# Patient Record
Sex: MALE | Race: WHITE | Employment: FULL TIME | URBAN - METROPOLITAN AREA
[De-identification: names, ages, dates, MRNs, and addresses within clinical notes are randomized per-mention and may not be internally consistent; named-entity substitution may affect disease eponyms.]

---

## 2017-02-24 ENCOUNTER — OFFICE VISIT (OUTPATIENT)
Dept: CARDIOLOGY | Facility: CLINIC | Age: 57
End: 2017-02-24
Payer: COMMERCIAL

## 2017-02-24 VITALS
DIASTOLIC BLOOD PRESSURE: 85 MMHG | SYSTOLIC BLOOD PRESSURE: 137 MMHG | HEART RATE: 67 BPM | WEIGHT: 153 LBS | BODY MASS INDEX: 26.12 KG/M2 | HEIGHT: 64 IN

## 2017-02-24 DIAGNOSIS — R00.2 PALPITATIONS: Primary | ICD-10-CM

## 2017-02-24 PROCEDURE — 99203 OFFICE O/P NEW LOW 30 MIN: CPT | Performed by: INTERNAL MEDICINE

## 2017-02-24 PROCEDURE — 93000 ELECTROCARDIOGRAM COMPLETE: CPT | Performed by: INTERNAL MEDICINE

## 2017-02-24 NOTE — PROGRESS NOTES
HPI and Plan:   See dictation        Orders Placed This Encounter   Procedures     EKG 12-lead complete w/read - Clinics (performed today)     No orders of the defined types were placed in this encounter.    Medications Discontinued During This Encounter   Medication Reason     Glucosamine 500 MG CAPS Stopped by Patient     Multiple Vitamins-Minerals (MULTIVITAL PO) Stopped by Patient     SAW PALMETTO Stopped by Patient         Encounter Diagnosis   Name Primary?     Palpitations Yes       CURRENT MEDICATIONS:  Current Outpatient Prescriptions   Medication Sig Dispense Refill     finasteride (PROSCAR) 5 MG tablet Take 1 tablet by mouth daily. 90 tablet 3       ALLERGIES     Allergies   Allergen Reactions     Erythromycin      Upset stomach     Penicillin G      Breathing        PAST MEDICAL HISTORY:  Past Medical History   Diagnosis Date     Alopecia      History of atrial fibrillation 1989     3 times so far     Hyperlipidemia      Palpitations        PAST SURGICAL HISTORY:  Past Surgical History   Procedure Laterality Date     Hc tooth extraction w/forcep       age 28     Herniorrhaphy inguinal       7/3/2010     Vasectomy       15 years ago       FAMILY HISTORY:  Family History   Problem Relation Age of Onset     CANCER Father      lung-smoker     HEART DISEASE Father      angioplasty-at age of 60's     Prostate Cancer Father      diagnosed early 70's     Family History Negative Mother      Family History Negative Sister      3     DIABETES No family hx of      Cancer - colorectal No family hx of        SOCIAL HISTORY:  Social History     Social History     Marital status: Unknown     Spouse name: N/A     Number of children: N/A     Years of education: N/A     Social History Main Topics     Smoking status: Never Smoker     Smokeless tobacco: Never Used     Alcohol use Yes      Comment: social     Drug use: No     Sexual activity: Yes     Partners: Female     Birth control/ protection: Surgical     Other Topics  "Concern     Parent/Sibling W/ Cabg, Mi Or Angioplasty Before 65f 55m? No     Social History Narrative         Review of Systems:  Skin:  Negative       Eyes:  Negative      ENT:  Negative      Respiratory:  Negative       Cardiovascular:    Positive for;chest pain;lightheadedness;palpitations (tingingling, all sx occured together)    Gastroenterology: Negative      Genitourinary:  Positive for prostate problem    Musculoskeletal:  Positive for (compression FX) back pain    Neurologic:  Negative      Psychiatric:  Negative      Heme/Lymph/Imm:  Positive for allergies    Endocrine:  Negative        Physical Exam:  Vitals: /85 (BP Location: Right arm, Patient Position: Chair, Cuff Size: Adult Regular)  Pulse 67  Ht 1.626 m (5' 4\")  Wt 69.4 kg (153 lb)  BMI 26.26 kg/m2    Constitutional:           Skin:           Head:           Eyes:           ENT:           Neck:           Chest:             Cardiac:                    Abdomen:           Vascular:                                          Extremities and Back:                 Neurological:             Recent Lab Results:  LIPID RESULTS:  Lab Results   Component Value Date    CHOL 208 (H) 12/07/2010    HDL 74 12/07/2010     12/07/2010    TRIG 47 12/07/2010    CHOLHDLRATIO 2.8 12/07/2010       LIVER ENZYME RESULTS:  No results found for: AST, ALT    CBC RESULTS:  Lab Results   Component Value Date    WBC 6.2 12/07/2010    RBC 4.67 12/07/2010    HGB 15.0 12/07/2010    HCT 43.8 12/07/2010    MCV 94 12/07/2010    MCH 32.1 12/07/2010    MCHC 34.2 12/07/2010    RDW 12.1 12/07/2010     12/07/2010       BMP RESULTS:  Lab Results   Component Value Date    GLC 97 12/07/2010        A1C RESULTS:  No results found for: A1C    INR RESULTS:  No results found for: INR        CC  No referring provider defined for this encounter.                    "

## 2017-02-24 NOTE — PROGRESS NOTES
HISTORY OF PRESENT ILLNESS:  I got together with Ray Nicola again today.  I originally saw him back in 2007 and then subsequently in 2010.  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  He had a history of some paroxysmal atrial fibrillation.  There were questions about his lipid profile and all in all it turned out that he has been a relatively healthy man.  He has not had any recurrence of his paroxysmal atrial fibrillation.  He watches his diet and exercises regularly with no sense of limitation.      Recently, he had a rather intense episode of the blue of a focal left parasternal discomfort.  It was quite focused, it was sharp, it was followed over several minutes by a sense of oral tingling and some anxiousness and discomfort.  He had some vague feelings in the neck towards his jaw, although not once again classically anginal or pressure, burning, tightness, squeezing, etc.      His symptoms resolved rather quickly.  He subsequently did his own stress test and went out for a run.  He is physically very fit and typically runs 8-1/2 to 9-minute miles and he exercised vigorously with no sense of recurrent symptoms or tightness, burning pressure, squeezing, heaviness in the chest.  His symptoms were decidedly atypical noncardiac sounding chest discomfort.      It has not been recurrent.  It only lasted for a matter of minutes and he has not had any further systemic symptoms.      Ray is 56.  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  He does not smoke.  He rarely drinks.  He exercises regularly.      Last summer he ran in a marathon and did so without any sense of limitation and it was satisfying for him.  He plans to run in another marathon later this year.      REVIEW OF SYSTEMS:  Negative only for this episode of chest discomfort, lightheadedness and overall tingling.    UPPER GI AND LOWER GASTROINTESTINAL:  Negative.   GENITOURINARY:  Positive for a little bit of  frequency.     MUSCULOSKELETAL:  He has had some intermittent back pain, but he treats this cautiously as he knows and understands back pain quite well.   PSYCHIATRIC, HEMATOLOGIC AND ENDOCRINE:  Negative.      CURRENT MEDICATIONS:  None.      Resting EKG done this morning was totally normal QRS and ST-T morphology.      PHYSICAL EXAMINATION:   GENERAL:  Reveals a trim, remarkably fit, adult male.   VITAL SIGNS:  Blood pressure 136/85.  Heart rate 66 beats per minute.  He weighs 153 pounds which is stable for him.   HEAD:  Normal.   NECK:  Free of neck vein distention or bruit.  Carotids are equal.   HEART:  Regular without gallop, murmur, rub or click.  PMI is normal in the mid clavicular line.   LUNGS:  Clear to auscultation.   ABDOMEN:  Flat without organomegaly, mass or pain.   EXTREMITIES:  Show +2 posterior tibial pulses, +1 dorsalis pedis pulses and no edema.      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  He takes meticulous care of himself.  He had transient focal episode of left parasternal discomfort that sounds musculoskeletal rather than cardiac or visceral.  We discussed doing things like checking some lab work and he says he will be visiting with you for primary care assessment subsequently.      We talked about getting a chest x-ray, although the fleeting nature of his discomfort did not push us in that direction.      We talked about getting a calcium score as a separate assessment of possible coronary artery disease and he will discuss that with you when he sees you.      IMPRESSION:   1.  Atypical noncardiac musculoskeletal left parasternal chest pain.   2.  No current evidence or support for angina.   3.  No recurrence of the aforementioned symptoms and remarkably healthy and fit otherwise.   4.  History of paroxysmal atrial fibrillation, but no current recurrences.   5.  He does exercise vigorously and plans to run another marathon this year.  We discussed doing a surveillance stress study.  He felt and I agreed that in  the absence of symptoms during significant vigorous exercise for the time being stress testing was not needed.      Prognosis is excellent.  If he has further problems or symptoms and I can be of help, let me know.      cc:   Ijeoma Lyons MD   [address], [address] [place] [address]  [id number]         MEGHA GARRISON MD, [facility]             D: 2017 12:02   T: 2017 12:38   MT: VD      Name:     DANIEL BERRY   MRN:      [id number]        Account:      [id number]   :      1960           Service Date: 2017      Document: [id number]

## 2017-02-24 NOTE — LETTER
2/24/2017    Ijeoma Lyons MD   [facility]   [address], [address]   [place]  [address]     RE: Ray Petersen       Dear Colleague,    I had the pleasure of seeing Ray BEN Petersen in the [facility].    I got together with Ray Petersen again today.  I originally saw him back in 2007 and then subsequently in 2010.  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  He had a history of some paroxysmal atrial fibrillation.  There were questions about his lipid profile and all in all it turned out that he has been a relatively healthy man.  He has not had any recurrence of his paroxysmal atrial fibrillation.  He watches his diet and exercises regularly with no sense of limitation.      Recently, he had a rather intense episode of the blue of a focal left parasternal discomfort.  It was quite focused, it was sharp, it was followed over several minutes by a sense of oral tingling and some anxiousness and discomfort.  He had some vague feelings in the neck towards his jaw, although not once again classically anginal or pressure, burning, tightness, squeezing, etc.      His symptoms resolved rather quickly.  He subsequently did his own stress test and went out for a run.  He is physically very fit and typically runs 8-1/2 to 9-minute miles and he exercised vigorously with no sense of recurrent symptoms or tightness, burning pressure, squeezing, heaviness in the chest.  His symptoms were decidedly atypical noncardiac sounding chest discomfort.      It has not been recurrent.  It only lasted for a matter of minutes and he has not had any further systemic symptoms.      Ray is 56.  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  He does not smoke.  He rarely drinks.  He exercises regularly.      Last summer he ran in a marathon and did so without any sense of limitation and it was satisfying for him.  He plans to run in another  marathon later this year.      REVIEW OF SYSTEMS:  Negative only for this episode of chest discomfort, lightheadedness and overall tingling.    UPPER GI AND LOWER GASTROINTESTINAL:  Negative.   GENITOURINARY:  Positive for a little bit of frequency.     MUSCULOSKELETAL:  He has had some intermittent back pain, but he treats this cautiously as he knows and understands back pain quite well.   PSYCHIATRIC, HEMATOLOGIC AND ENDOCRINE:  Negative.      CURRENT MEDICATIONS:  None.      Resting EKG done this morning was totally normal QRS and ST-T morphology.      PHYSICAL EXAMINATION:   GENERAL:  Reveals a trim, remarkably fit, adult male.   VITAL SIGNS:  Blood pressure 136/85.  Heart rate 66 beats per minute.  He weighs 153 pounds which is stable for him.   HEAD:  Normal.   NECK:  Free of neck vein distention or bruit.  Carotids are equal.   HEART:  Regular without gallop, murmur, rub or click.  PMI is normal in the mid clavicular line.   LUNGS:  Clear to auscultation.   ABDOMEN:  Flat without organomegaly, mass or pain.   EXTREMITIES:  Show +2 posterior tibial pulses, +1 dorsalis pedis pulses and no edema.      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  He takes meticulous care of himself.  He had transient focal episode of left parasternal discomfort that sounds musculoskeletal rather than cardiac or visceral.  We discussed doing things like checking some lab work and he says he will be visiting with you for primary care assessment subsequently.      We talked about getting a chest x-ray, although the fleeting nature of his discomfort did not push us in that direction.      We talked about getting a calcium score as a separate assessment of possible coronary artery disease and he will discuss that with you when he sees you.      IMPRESSION:   1.  Atypical noncardiac musculoskeletal left parasternal chest pain.   2.  No current evidence or support for angina.   3.  No recurrence of the aforementioned symptoms and remarkably healthy and fit  otherwise.   4.  History of paroxysmal atrial fibrillation, but no current recurrences.   5.  He does exercise vigorously and plans to run another marathon this year.  We discussed doing a surveillance stress study.  He felt and I agreed that in the absence of symptoms during significant vigorous exercise for the time being stress testing was not needed.      Prognosis is excellent.  If he has further problems or symptoms and I can be of help, let me know.     Again, thank you for allowing me to participate in the care of your patient.      Sincerely,    MEGHA GARRISON MD     [facility]

## 2017-04-11 ENCOUNTER — HOSPITAL ENCOUNTER (EMERGENCY)
Facility: CLINIC | Age: 57
Discharge: HOME | End: 2017-04-11
Attending: CLINICAL NURSE SPECIALIST | Admitting: CLINICAL NURSE SPECIALIST
Payer: COMMERCIAL

## 2017-04-11 VITALS
HEART RATE: 80 BPM | TEMPERATURE: 97.5 F | WEIGHT: 150 LBS | RESPIRATION RATE: 18 BRPM | BODY MASS INDEX: 24.11 KG/M2 | DIASTOLIC BLOOD PRESSURE: 96 MMHG | SYSTOLIC BLOOD PRESSURE: 151 MMHG | HEIGHT: 66 IN | OXYGEN SATURATION: 98 %

## 2017-04-11 DIAGNOSIS — S01.81XA FACIAL LACERATION, INITIAL ENCOUNTER: ICD-10-CM

## 2017-04-11 PROCEDURE — 90471 IMMUNIZATION ADMIN: CPT

## 2017-04-11 PROCEDURE — 12011 RPR F/E/E/N/L/M 2.5 CM/<: CPT

## 2017-04-11 PROCEDURE — 27110038 ZZH RX 271: Performed by: CLINICAL NURSE SPECIALIST

## 2017-04-11 PROCEDURE — 25000125 ZZHC RX 250: Performed by: CLINICAL NURSE SPECIALIST

## 2017-04-11 PROCEDURE — 99283 EMERGENCY DEPT VISIT LOW MDM: CPT | Mod: 25

## 2017-04-11 PROCEDURE — 90715 TDAP VACCINE 7 YRS/> IM: CPT | Performed by: CLINICAL NURSE SPECIALIST

## 2017-04-11 RX ADMIN — Medication: at 21:31

## 2017-04-11 RX ADMIN — CLOSTRIDIUM TETANI TOXOID ANTIGEN (FORMALDEHYDE INACTIVATED), CORYNEBACTERIUM DIPHTHERIAE TOXOID ANTIGEN (FORMALDEHYDE INACTIVATED), BORDETELLA PERTUSSIS TOXOID ANTIGEN (GLUTARALDEHYDE INACTIVATED), BORDETELLA PERTUSSIS FILAMENTOUS HEMAGGLUTININ ANTIGEN (FORMALDEHYDE INACTIVATED), BORDETELLA PERTUSSIS PERTACTIN ANTIGEN, AND BORDETELLA PERTUSSIS FIMBRIAE 2/3 ANTIGEN 0.5 ML: 5; 2; 2.5; 5; 3; 5 INJECTION, SUSPENSION INTRAMUSCULAR at 21:29

## 2017-04-11 ASSESSMENT — ENCOUNTER SYMPTOMS
BACK PAIN: 0
VOMITING: 0
ROS SKIN COMMENTS: LACERATION TO RIGHT EYEBROW
HEADACHES: 0
NECK PAIN: 0

## 2017-04-11 NOTE — ED AVS SNAPSHOT
Emergency Department    [address] [place] [address]    Phone:  [phone]    Fax:  [phone]                                       Ray Petersen   MRN: [id number]    Department:   Emergency Department   Date of Visit:  4/11/2017           Patient Information     Date Of Birth          1960        Your diagnoses for this visit were:     Facial laceration, initial encounter        You were seen by Velia Basilio, [facility].      Follow-up Information     Follow up with Lilia Restrepo.    Why:  5-7 days for suture removal      Discharge References/Attachments     LACERATION, FACE: SUTURE OR TAPE (ENGLISH)      24 Hour Appointment Hotline       To make an appointment at any [facility], call [phone] ([phone]). If you don't have a family doctor or clinic, we will help you find one. [facility] clinics are conveniently located to serve the needs of you and your family.             Review of your medicines      Our records show that you are taking the medicines listed below. If these are incorrect, please call your family doctor or clinic.        Dose / Directions Last dose taken    finasteride 5 MG tablet   Commonly known as:  PROSCAR   Dose:  1 tablet   Quantity:  90 tablet        Take 1 tablet by mouth daily.   Refills:  3                Orders Needing Specimen Collection     None      Pending Results     No orders found from 4/9/2017 to 4/12/2017.            Pending Culture Results     No orders found from 4/9/2017 to 4/12/2017.            Test Results From Your Hospital Stay               Clinical Quality Measure: Blood Pressure Screening     Your blood pressure was checked while you were in the emergency department today. The last reading we obtained was  BP: (!) 151/96 . Please read the guidelines below about what these numbers mean and what you should do about them.  If your systolic blood pressure (the top number) is less than 120 and your diastolic  blood pressure (the bottom number) is less than 80, then your blood pressure is normal. There is nothing more that you need to do about it.  If your systolic blood pressure (the top number) is 120-139 or your diastolic blood pressure (the bottom number) is 80-89, your blood pressure may be higher than it should be. You should have your blood pressure rechecked within a year by a primary care provider.  If your systolic blood pressure (the top number) is 140 or greater or your diastolic blood pressure (the bottom number) is 90 or greater, you may have high blood pressure. High blood pressure is treatable, but if left untreated over time it can put you at risk for heart attack, stroke, or kidney failure. You should have your blood pressure rechecked by a primary care provider within the next 4 weeks.  If your provider in the emergency department today gave you specific instructions to follow-up with your doctor or provider even sooner than that, you should follow that instruction and not wait for up to 4 weeks for your follow-up visit.        Thank you for choosing [facility]       Thank you for choosing [facility] for your care. Our goal is always to provide you with excellent care. Hearing back from our patients is one way we can continue to improve our services. Please take a few minutes to complete the written survey that you may receive in the mail after you visit with us. Thank you!        [facility] Information     [facility] gives you secure access to your electronic health record. If you see a primary care provider, you can also send messages to your care team and make appointments. If you have questions, please call your primary care clinic.  If you do not have a primary care provider, please call [phone] and they will assist you.        Care EveryWhere ID     This is your Care EveryWhere ID. This could be used by other organizations to access your [facility] medical records  [id number]        After Visit Summary        This is your record. Keep this with you and show to your community pharmacist(s) and doctor(s) at your next visit.

## 2017-04-11 NOTE — ED AVS SNAPSHOT
Emergency Department    [address] [address]    Phone:  [phone]    Fax:  [phone]                                       Ray Petersen   MRN: [id number]    Department:   Emergency Department   Date of Visit:  4/11/2017           After Visit Summary Signature Page     I have received my discharge instructions, and my questions have been answered. I have discussed any challenges I see with this plan with the nurse or doctor.    ..........................................................................................................................................  Patient/Patient Representative Signature      ..........................................................................................................................................  Patient Representative Print Name and Relationship to Patient    ..................................................               ................................................  Date                                            Time    ..........................................................................................................................................  Reviewed by Signature/Title    ...................................................              ..............................................  Date                                                            Time

## 2017-04-12 NOTE — ED PROVIDER NOTES
"  History     Chief Complaint:  Laceration      HPI   Ray Petersen is a 56 year old male who presents with a laceration.  He was out for an evening run and he tripped on a stump and hit his head.  He states that he \"saw stars\" but he did not lose consciousness.   He reports that his forehead started to bleed but he had gloves with him and was able to staunch the bleeding by using his glove and applying pressure. He denies any headache, neck or back pain or vomiting.       Allergies:  Erythromycin - GI upset  Penicillin G - Breathing difficulty    Medications:    Finasteride    Past Medical History:    Alopecia  Atrial Fibrillation - 1989  Hyperlipidemia  Palpitations    Past Surgical History:    Herniorrhaphy inguinal - 2010  Vasectomy - 2002    Family History:    Father - angioplasty age 60, prostate cancer dx early 70s    Social History:  The patient was unaccompanied to the ED.  Smoking Status: Never smoker  Smokeless Tobacco: never used  Alcohol Use: Yes, social  Marital Status:  Unknown [6]       Review of Systems   Gastrointestinal: Negative for vomiting.   Musculoskeletal: Negative for back pain and neck pain.   Skin:        Laceration to right eyebrow   Neurological: Negative for syncope and headaches.   All other systems reviewed and are negative.        Physical Exam   First Vitals:  BP: (!) 151/96  Pulse: 82  Temp: 97.5  F (36.4  C)  Resp: 16  Height: 167.6 cm (5' 6\")  Weight: 68 kg (150 lb)  SpO2: 97 %    Physical Exam    Physical Exam   Constitutional: Pt appears well-developed and well-nourished. Non toxic appearing.   ENT: Oropharynx is moist. No hemotympanums  Eyes: EOMs intact. Pupils are equal, round, and reactive to light.    Pulmonary/Chest: No respiratory distress.     Musc: No bony cervical spinal tenderness  Neurological: No focal deficits. GCS 15  Skin: 2.5 cm laceration to lateral right eyebrow without bony orbital tenderness.     Emergency Department Course   Interventions:  2129 - Tdap " injection 0.5 ml IM      Procedures:     Laceration Repair      LACERATION:  A simple minimally contaminated 2.5 cm laceration.    LOCATION:  Right eyebrow.    FUNCTION:  sensation and circulation are intact.    ANESTHESIA:  LET - Topical.    PREPARATION:  Irrigation with Shur Clens.    DEBRIDEMENT:  no debridement.    CLOSURE:  Wound was closed with One Layer.  Skin closed with 7 x 6.0 Prolene using interrupted sutures..      Emergency Department Course:  Nursing notes and vitals reviewed.  I performed an exam of the patient as documented above.   I performed a laceration repair, as noted in procedures above.  I discussed the treatment plan with the patient. They expressed understanding of this plan and consented to discharge. They will be discharged home with instructions for care and follow up. In addition, the patient will return to the emergency department if their symptoms persist, worsen, if new symptoms arise or if there is any concern.  All questions were answered.  I personally reviewed the laboratory results with the patient and answered all related questions prior to discharge.    Impression & Plan      Medical Decision Making:  The patient presented with a laceration.  The wound was carefully evaluated and explored.  No foreign bodies were noted on exploration or irrigation.  The laceration was closed with sutures/staples as noted herein.  There is no evidence of muscular, tendon, or bony damage with this laceration.  There is also no evidence of vascular or neurologic compromise.  Possible complications (infection, scarring) were reviewed with the patient.  Follow up with primary care will be indicated for suture/staple removal as noted in the discharge section.      Diagnosis:    ICD-10-CM    1. Facial laceration, initial encounter S01.81XA        Disposition:   Discharge home        Scribe Disclosure:  Janeth GUZMÁN, am serving as a scribe at 9:20 PM on 4/11/2017 to document services personally  performed by Velia Basilio, [facility], based on my observations and the provider's statements to me.      Janeth Younger  4/11/2017    EMERGENCY DEPARTMENT       Velia Basilio, [facility]  04/11/17 2230

## 2019-10-02 ENCOUNTER — HEALTH MAINTENANCE LETTER (OUTPATIENT)
Age: 59
End: 2019-10-02

## 2020-01-06 ENCOUNTER — TRANSFERRED RECORDS (OUTPATIENT)
Dept: HEALTH INFORMATION MANAGEMENT | Facility: CLINIC | Age: 60
End: 2020-01-06

## 2020-01-06 LAB
ALBUMIN SERPL-MCNC: 4.7 G/DL
ALP SERPL-CCNC: 53 U/L
ALT SERPL-CCNC: 13 U/L
ANION GAP SERPL CALCULATED.3IONS-SCNC: NORMAL MMOL/L
AST SERPL-CCNC: 18 U/L
BILIRUB SERPL-MCNC: 0.6 MG/DL
BUN SERPL-MCNC: 12 MG/DL
CALCIUM SERPL-MCNC: 9.2 MG/DL
CHLORIDE SERPLBLD-SCNC: 100 MMOL/L
CHOLEST SERPL-MCNC: 184 MG/DL
CO2 SERPL-SCNC: 23 MMOL/L
CREAT SERPL-MCNC: 0.98 MG/DL
ERYTHROCYTE [DISTWIDTH] IN BLOOD BY AUTOMATED COUNT: 12.9 %
GFR SERPL CREATININE-BSD FRML MDRD: 84 ML/MIN/1.73M2
GLUCOSE SERPL-MCNC: 91 MG/DL (ref 65–99)
HCT VFR BLD AUTO: 44.5 %
HDLC SERPL-MCNC: 77 MG/DL
HEMOGLOBIN: 15.5 G/DL (ref 13–17.7)
LDLC SERPL CALC-MCNC: 98 MG/DL
MCH RBC QN AUTO: 33.4 PG
MCHC RBC AUTO-ENTMCNC: 34.8 G/DL
MCV RBC AUTO: 96 FL
NONHDLC SERPL-MCNC: NORMAL MG/DL
PLATELET # BLD AUTO: 262 10^9/L
POTASSIUM SERPL-SCNC: 4.4 MMOL/L
PROT SERPL-MCNC: 7.6 G/DL
RBC # BLD AUTO: 4.64 10^12/L
SODIUM SERPL-SCNC: 138 MMOL/L
TRIGL SERPL-MCNC: 43 MG/DL
WBC # BLD AUTO: 9.7 10^9/L

## 2021-01-15 ENCOUNTER — HEALTH MAINTENANCE LETTER (OUTPATIENT)
Age: 61
End: 2021-01-15

## 2021-02-23 ENCOUNTER — TELEPHONE (OUTPATIENT)
Dept: CARDIOLOGY | Facility: CLINIC | Age: 61
End: 2021-02-23

## 2021-02-23 NOTE — TELEPHONE ENCOUNTER
Attempted to call Patient for  information. Voice mail not accepting messages - full.  Called PCP listed  in [facility] did not find any visits or recent in their system. Per scheduling.

## 2021-02-24 NOTE — TELEPHONE ENCOUNTER
Faxed records request to [facility]      1030 received records from PCP clinic for January 2020. Labs entered and sent to scan

## 2021-02-26 ENCOUNTER — OFFICE VISIT (OUTPATIENT)
Dept: CARDIOLOGY | Facility: CLINIC | Age: 61
End: 2021-02-26
Payer: COMMERCIAL

## 2021-02-26 VITALS
BODY MASS INDEX: 26.98 KG/M2 | WEIGHT: 158 LBS | OXYGEN SATURATION: 97 % | SYSTOLIC BLOOD PRESSURE: 130 MMHG | HEIGHT: 64 IN | HEART RATE: 78 BPM | DIASTOLIC BLOOD PRESSURE: 86 MMHG

## 2021-02-26 DIAGNOSIS — R00.2 PALPITATIONS: Primary | ICD-10-CM

## 2021-02-26 PROCEDURE — 93000 ELECTROCARDIOGRAM COMPLETE: CPT | Performed by: INTERNAL MEDICINE

## 2021-02-26 PROCEDURE — 99204 OFFICE O/P NEW MOD 45 MIN: CPT | Mod: 25 | Performed by: INTERNAL MEDICINE

## 2021-02-26 ASSESSMENT — MIFFLIN-ST. JEOR: SCORE: 1429.74

## 2021-02-26 NOTE — LETTER
2/26/2021    Jefferson Crawford PA-C  [email or web address] [facility] [address]  [facility] [address]    RE: Ray CONTRERAS Nicola     Dear Colleague,    I had the pleasure of seeing Ray Petersen in the [facility].  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 Hernandez for intermittent atrial fibrillation (not documented).  He had an episode of tachycardia  about 5 days ago which lasted several hours.  He thought it would be good to reevaluate the atrial fibrillation.    He notes that his first episode occurred when he was 27 or 28 years old.  He was able to take his pulse and noted it was tachycardic and he thought it was also irregular.  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  He has an episode every several years which originates with swallowing something very cold.  He is typically careful not to do that but with this last episode, he had become very dehydrated working through the day and he rapidly grabbed something cold to drink.   He immediately noted the onset of palpitations.  He never has chest discomfort, shortness of breath or lightheadedness and he did not this time.  He apparently went to bed and the episode resolved after several hours.  He has been feeling fine since that time.  The episodes are always associated with drinking something very cold and the palpitations/tachycardia start immediately in response to that stimulus.  The longest episode was nearly 3 days and most are several hours long.    He is very fit and runs marathon races and had apparently run 5 miles a day the tachycardia occurred.  He has about 2 glasses of wine daily.  He does not think he has sleep apnea and does not snore.  He denies any chest, neck, arm or back discomfort.  His exertional tolerance has not noticeably changed recently.  An echocardiogram in 2001 demonstrated only mild  "mitral insufficiency but was otherwise structurally normal.    He has never smoked.  He does note that he has a strong maternal history of coronary artery disease.  This has affected males in their 60s years of age.  He does not have dyslipidemia (last LDL 98 mg/dL with HDL 77 mg/dL), diabetes mellitus or hypertension.  His only medication is finasteride.       Exam:  This is a fit appearing man in no apparent distress.  The blood pressure was 130/86 mmHg.  The chest was clear to auscultation.  On cardiac auscultation, there was an S1 and S2 without extra sounds or murmur.  The rhythm was regular.     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  Apparently, it has not been documented.  His longest episode was several years ago and lasted nearly 3 days.  Typically the episodes last several hours and resolve spontaneously.  It is impossible to be certain that he had atrial fibrillation/flutter with his prior episodes or even with this episode.  It was discussed that we typically experience palpitations with tachycardia but we can have asymptomatic atrial fibrillation/flutter (or other arrhythmias).  We elected to proceed with \"sampling\" in the form of a 7-day Zio patch monitor to see if there is additional evidence of asymptomatic atrial fibrillation/flutter.  A stress echocardiogram will be done both to assess for ischemia given his significant family history of coronary disease, to possibly initiate the arrhythmia and to screen for structural heart disease.  That seems particularly important given that he runs marathon races.  It was further recommended that he consider decreasing his alcohol intake which he may have better tolerated when he was younger.    It is indeed possible that he has vaguely-mediated atrial fibrillation/flutter.  He may also have supraventricular tachycardia or " another presumably benign arrhythmia.  He may eventually benefit from beta-blockade either on a as needed basis or a daily basis.  At this time, he has no risk factors for stroke and the episodes are symptomatically, very infrequent.  That was further discussed.  Follow-up has also been arranged.  Eventually, he may benefit from electrophysiology consultation and consideration of longer-term monitoring (possibly even a personal device with EKG recording capability).  If he has another episode, seeking an EKG would be of great benefit.                   Encounter Diagnosis   Name Primary?     Palpitations Yes       CURRENT MEDICATIONS:  Current Outpatient Medications   Medication Sig Dispense Refill     finasteride (PROSCAR) 5 MG tablet Take 1 tablet by mouth daily. 90 tablet 3       ALLERGIES     Allergies   Allergen Reactions     Erythromycin      Upset stomach     Penicillin G      Breathing        PAST MEDICAL HISTORY:  Past Medical History:   Diagnosis Date     Alopecia      History of atrial fibrillation 1989    3 times so far     Hyperlipidemia      Palpitations        PAST SURGICAL HISTORY:  Past Surgical History:   Procedure Laterality Date     HC TOOTH EXTRACTION W/FORCEP      age 28     HERNIORRHAPHY INGUINAL      7/3/2010     VASECTOMY      15 years ago       FAMILY HISTORY:  Family History   Problem Relation Age of Onset     Cancer Father         lung-smoker     Heart Disease Father         angioplasty-at age of 60's     Prostate Cancer Father         diagnosed early 70's     Family History Negative Mother      Family History Negative Sister         3     Diabetes No family hx of      Cancer - colorectal No family hx of        SOCIAL HISTORY:  Social History     Socioeconomic History     Marital status:      Spouse name: None     Number of children: None     Years of education: None     Highest education level: None   Occupational History     None   Social Needs     Financial resource strain:  "None     Food insecurity     Worry: None     Inability: None     Transportation needs     Medical: None     Non-medical: None   Tobacco Use     Smoking status: Never Smoker     Smokeless tobacco: Never Used   Substance and Sexual Activity     Alcohol use: Yes     Comment: social     Drug use: No     Sexual activity: Yes     Partners: Female     Birth control/protection: Surgical   Lifestyle     Physical activity     Days per week: None     Minutes per session: None     Stress: None   Relationships     Social connections     Talks on phone: None     Gets together: None     Attends Latter-day service: None     Active member of club or organization: None     Attends meetings of clubs or organizations: None     Relationship status: None     Intimate partner violence     Fear of current or ex partner: None     Emotionally abused: None     Physically abused: None     Forced sexual activity: None   Other Topics Concern     Parent/sibling w/ CABG, MI or angioplasty before 65F 55M? No   Social History Narrative     None       Review of Systems:  Skin:  Negative       Eyes:  Negative      ENT:  Negative      Respiratory:  Negative       Cardiovascular:    Positive for;chest pain;lightheadedness;palpitations(tingingling, all sx occured together)    Gastroenterology: Negative      Genitourinary:  Positive for prostate problem    Musculoskeletal:  Positive for(compression FX) back pain    Neurologic:  Negative      Psychiatric:  Negative      Heme/Lymph/Imm:  Positive for allergies    Endocrine:  Negative        Physical Exam:  Vitals: /86   Pulse 78   Ht 1.613 m (5' 3.5\")   Wt 71.7 kg (158 lb)   SpO2 97%   BMI 27.55 kg/m      Constitutional:  cooperative, alert and oriented, well developed, well nourished, in no acute distress        Skin:  warm and dry to the touch          Head:  normocephalic        Eyes:  sclera white        Lymph:      ENT:           Neck:           Respiratory:  normal breath sounds, clear to " auscultation, normal A-P diameter, normal symmetry, normal respiratory excursion, no use of accessory muscles         Cardiac: regular rhythm, normal S1/S2, no S3 or S4, apical impulse not displaced, no murmurs, gallops or rubs                                                         GI:           Extremities and Muscular Skeletal:                 Neurological:  no gross motor deficits;affect appropriate        Psych:  Alert and Oriented x 3;affect appropriate, oriented to time, person and place      Thank you for allowing me to participate in the care of your patient.    Sincerely,     Fatuma Arizmendi MD   [facility]

## 2021-02-26 NOTE — PROGRESS NOTES
HPI and Plan:      Ray Nicola is 60 years old and has previously seen Dr. Hernandez for intermittent atrial fibrillation (not documented).  He had an episode of tachycardia  about 5 days ago which lasted several hours.  He thought it would be good to reevaluate the atrial fibrillation.    He notes that his first episode occurred when he was 27 or 28 years old.  He was able to take his pulse and noted it was tachycardic and he thought it was also irregular.  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  He has an episode every several years which originates with swallowing something very cold.  He is typically careful not to do that but with this last episode, he had become very dehydrated working through the day and he rapidly grabbed something cold to drink.   He immediately noted the onset of palpitations.  He never has chest discomfort, shortness of breath or lightheadedness and he did not this time.  He apparently went to bed and the episode resolved after several hours.  He has been feeling fine since that time.  The episodes are always associated with drinking something very cold and the palpitations/tachycardia start immediately in response to that stimulus.  The longest episode was nearly 3 days and most are several hours long.    He is very fit and runs marathon races and had apparently run 5 miles a day the tachycardia occurred.  He has about 2 glasses of wine daily.  He does not think he has sleep apnea and does not snore.  He denies any chest, neck, arm or back discomfort.  His exertional tolerance has not noticeably changed recently.  An echocardiogram in 2001 demonstrated only mild mitral insufficiency but was otherwise structurally normal.    He has never smoked.  He does note that he has a strong maternal history of coronary artery disease.  This has affected males in their 60s years of age.  He does not have dyslipidemia (last LDL 98 mg/dL with  "HDL 77 mg/dL), diabetes mellitus or hypertension.  His only medication is finasteride.       Exam:  This is a fit appearing man in no apparent distress.  The blood pressure was 130/86 mmHg.  The chest was clear to auscultation.  On cardiac auscultation, there was an S1 and S2 without extra sounds or murmur.  The rhythm was regular.     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  Apparently, it has not been documented.  His longest episode was several years ago and lasted nearly 3 days.  Typically the episodes last several hours and resolve spontaneously.  It is impossible to be certain that he had atrial fibrillation/flutter with his prior episodes or even with this episode.  It was discussed that we typically experience palpitations with tachycardia but we can have asymptomatic atrial fibrillation/flutter (or other arrhythmias).  We elected to proceed with \"sampling\" in the form of a 7-day Zio patch monitor to see if there is additional evidence of asymptomatic atrial fibrillation/flutter.  A stress echocardiogram will be done both to assess for ischemia given his significant family history of coronary disease, to possibly initiate the arrhythmia and to screen for structural heart disease.  That seems particularly important given that he runs marathon races.  It was further recommended that he consider decreasing his alcohol intake which he may have better tolerated when he was younger.    It is indeed possible that he has vaguely-mediated atrial fibrillation/flutter.  He may also have supraventricular tachycardia or another presumably benign arrhythmia.  He may eventually benefit from beta-blockade either on a as needed basis or a daily basis.  At this time, he has no risk factors for stroke and the episodes are symptomatically, very infrequent.  That was further discussed.  Follow-up " has also been arranged.  Eventually, he may benefit from electrophysiology consultation and consideration of longer-term monitoring (possibly even a personal device with EKG recording capability).  If he has another episode, seeking an EKG would be of great benefit.                   Encounter Diagnosis   Name Primary?     Palpitations Yes       CURRENT MEDICATIONS:  Current Outpatient Medications   Medication Sig Dispense Refill     finasteride (PROSCAR) 5 MG tablet Take 1 tablet by mouth daily. 90 tablet 3       ALLERGIES     Allergies   Allergen Reactions     Erythromycin      Upset stomach     Penicillin G      Breathing        PAST MEDICAL HISTORY:  Past Medical History:   Diagnosis Date     Alopecia      History of atrial fibrillation 1989    3 times so far     Hyperlipidemia      Palpitations        PAST SURGICAL HISTORY:  Past Surgical History:   Procedure Laterality Date     HC TOOTH EXTRACTION W/FORCEP      age 28     HERNIORRHAPHY INGUINAL      7/3/2010     VASECTOMY      15 years ago       FAMILY HISTORY:  Family History   Problem Relation Age of Onset     Cancer Father         lung-smoker     Heart Disease Father         angioplasty-at age of 60's     Prostate Cancer Father         diagnosed early 70's     Family History Negative Mother      Family History Negative Sister         3     Diabetes No family hx of      Cancer - colorectal No family hx of        SOCIAL HISTORY:  Social History     Socioeconomic History     Marital status:      Spouse name: None     Number of children: None     Years of education: None     Highest education level: None   Occupational History     None   Social Needs     Financial resource strain: None     Food insecurity     Worry: None     Inability: None     Transportation needs     Medical: None     Non-medical: None   Tobacco Use     Smoking status: Never Smoker     Smokeless tobacco: Never Used   Substance and Sexual Activity     Alcohol use: Yes     Comment:  "social     Drug use: No     Sexual activity: Yes     Partners: Female     Birth control/protection: Surgical   Lifestyle     Physical activity     Days per week: None     Minutes per session: None     Stress: None   Relationships     Social connections     Talks on phone: None     Gets together: None     Attends [employer] service: None     Active member of club or organization: None     Attends meetings of clubs or organizations: None     Relationship status: None     Intimate partner violence     Fear of current or ex partner: None     Emotionally abused: None     Physically abused: None     Forced sexual activity: None   Other Topics Concern     Parent/sibling w/ CABG, MI or angioplasty before 65F 55M? No   Social History Narrative     None       Review of Systems:  Skin:  Negative       Eyes:  Negative      ENT:  Negative      Respiratory:  Negative       Cardiovascular:    Positive for;chest pain;lightheadedness;palpitations(tingingling, all sx occured together)    Gastroenterology: Negative      Genitourinary:  Positive for prostate problem    Musculoskeletal:  Positive for(compression FX) back pain    Neurologic:  Negative      Psychiatric:  Negative      Heme/Lymph/Imm:  Positive for allergies    Endocrine:  Negative        Physical Exam:  Vitals: /86   Pulse 78   Ht 1.613 m (5' 3.5\")   Wt 71.7 kg (158 lb)   SpO2 97%   BMI 27.55 kg/m      Constitutional:  cooperative, alert and oriented, well developed, well nourished, in no acute distress        Skin:  warm and dry to the touch          Head:  normocephalic        Eyes:  sclera white        Lymph:      ENT:           Neck:           Respiratory:  normal breath sounds, clear to auscultation, normal A-P diameter, normal symmetry, normal respiratory excursion, no use of accessory muscles         Cardiac: regular rhythm, normal S1/S2, no S3 or S4, apical impulse not displaced, no murmurs, gallops or rubs                                                 "         GI:           Extremities and Muscular Skeletal:                 Neurological:  no gross motor deficits;affect appropriate        Psych:  Alert and Oriented x 3;affect appropriate, oriented to time, person and place          CC  No referring provider defined for this encounter.

## 2022-02-19 ENCOUNTER — HEALTH MAINTENANCE LETTER (OUTPATIENT)
Age: 62
End: 2022-02-19

## 2023-04-01 ENCOUNTER — HEALTH MAINTENANCE LETTER (OUTPATIENT)
Age: 63
End: 2023-04-01

## 2024-07-17 NOTE — MR AVS SNAPSHOT
"              After Visit Summary   2/24/2017    Ray Petersen    MRN: [id number]           Patient Information     Date Of Birth          1960        Visit Information        Provider Department      2/24/2017 11:00 AM Curtis Hernandez MD [facility] [place]        Today's Diagnoses     Palpitations    -  1       Follow-ups after your visit        Who to contact     If you have questions or need follow up information about today's clinic visit or your schedule please contact [facility] [place] directly at [phone].  Normal or non-critical lab and imaging results will be communicated to you by [employer], letter or phone within 4 business days after the clinic has received the results. If you do not hear from us within 7 days, please contact the clinic through [employer] or phone. If you have a critical or abnormal lab result, we will notify you by phone as soon as possible.  Submit refill requests through [employer] or call your pharmacy and they will forward the refill request to us. Please allow 3 business days for your refill to be completed.          Additional Information About Your Visit        MyChart Information     [employer] gives you secure access to your electronic health record. If you see a primary care provider, you can also send messages to your care team and make appointments. If you have questions, please call your primary care clinic.  If you do not have a primary care provider, please call [phone] and they will assist you.        Care EveryWhere ID     This is your Care EveryWhere ID. This could be used by other organizations to access your [place] medical records  [id number]        Your Vitals Were     Pulse Height BMI (Body Mass Index)             67 1.626 m (5' 4\") 26.26 kg/m2          Blood Pressure from Last 3 Encounters:   02/24/17 137/85   12/31/12 118/70   12/07/10 122/70    Weight from Last 3 Encounters: "   02/24/17 69.4 kg (153 lb)   12/31/12 69.4 kg (153 lb)   12/07/10 69.3 kg (152 lb 11.2 oz)              We Performed the Following     EKG 12-lead complete w/read - Clinics (performed today)        Primary Care Provider    Lilia Restrepo       No address on file        Thank you!     Thank you for choosing [facility] [place]  for your care. Our goal is always to provide you with excellent care. Hearing back from our patients is one way we can continue to improve our services. Please take a few minutes to complete the written survey that you may receive in the mail after your visit with us. Thank you!             Your Updated Medication List - Protect others around you: Learn how to safely use, store and throw away your medicines at www.disposemymeds.org.          This list is accurate as of: 2/24/17 11:55 AM.  Always use your most recent med list.                   Brand Name Dispense Instructions for use    finasteride 5 MG tablet    PROSCAR    90 tablet    Take 1 tablet by mouth daily.          Home